# Patient Record
Sex: MALE | URBAN - METROPOLITAN AREA
[De-identification: names, ages, dates, MRNs, and addresses within clinical notes are randomized per-mention and may not be internally consistent; named-entity substitution may affect disease eponyms.]

---

## 2024-01-02 ENCOUNTER — OFFICE VISIT (OUTPATIENT)
Dept: URBAN - METROPOLITAN AREA CLINIC 27 | Facility: CLINIC | Age: 39
End: 2024-01-02
Payer: COMMERCIAL

## 2024-01-02 VITALS
BODY MASS INDEX: 28.77 KG/M2 | SYSTOLIC BLOOD PRESSURE: 134 MMHG | HEART RATE: 66 BPM | HEIGHT: 70 IN | DIASTOLIC BLOOD PRESSURE: 89 MMHG | WEIGHT: 201 LBS

## 2024-01-02 DIAGNOSIS — R19.8 IRREGULAR BOWEL HABITS: ICD-10-CM

## 2024-01-02 DIAGNOSIS — R63.4 WEIGHT LOSS: ICD-10-CM

## 2024-01-02 DIAGNOSIS — R14.0 BLOATING: ICD-10-CM

## 2024-01-02 DIAGNOSIS — R63.0 LOSS OF APPETITE: ICD-10-CM

## 2024-01-02 PROCEDURE — 99244 OFF/OP CNSLTJ NEW/EST MOD 40: CPT | Performed by: INTERNAL MEDICINE

## 2024-01-02 PROCEDURE — 99204 OFFICE O/P NEW MOD 45 MIN: CPT | Performed by: INTERNAL MEDICINE

## 2024-01-02 NOTE — HPI-TODAY'S VISIT:
Patient here at the request of Dr. Arriola for evaluation of bloating that has been present for the past month.  His symptoms began in early December without specific trigger.  The bloating was associated with persistent eructation.  He notes that the eructation is worse with alcohol and fried foods.  He has been using omeprazole and Gas-X which are somewhat helpful.  A recent 2-week trial of a probiotic was unhelpful.  He has lost 15 pounds over the past month, some of which he attributes to the above symptoms, and some to intermittent fasting (which did help his symptoms somewhat).  He has also had intermittent early satiety.  He has not had any reflux symptoms or dysphagia.  His stools have been irregular and tend towards mild constipation.  He does not take a fiber supplement and has not tried MiraLAX or prune juice.  He has had a decreased appetite.  He has not had a prior upper or lower endoscopy.  Recent labs and CT abdomen pelvis were unremarkable.  He does have mild/?moderate anxiety. There is no known family history of CRC or polyps.

## 2024-01-03 ENCOUNTER — LAB OUTSIDE AN ENCOUNTER (OUTPATIENT)
Dept: URBAN - METROPOLITAN AREA CLINIC 27 | Facility: CLINIC | Age: 39
End: 2024-01-03

## 2024-01-03 LAB — H PYLORI BREATH TEST: NOT DETECTED

## 2024-01-08 ENCOUNTER — WEB ENCOUNTER (OUTPATIENT)
Dept: URBAN - METROPOLITAN AREA CLINIC 27 | Facility: CLINIC | Age: 39
End: 2024-01-08

## 2024-01-12 ENCOUNTER — CLAIMS CREATED FROM THE CLAIM WINDOW (OUTPATIENT)
Dept: URBAN - METROPOLITAN AREA CLINIC 4 | Facility: CLINIC | Age: 39
End: 2024-01-12
Payer: COMMERCIAL

## 2024-01-12 ENCOUNTER — CLAIMS CREATED FROM THE CLAIM WINDOW (OUTPATIENT)
Dept: URBAN - METROPOLITAN AREA SURGERY CENTER 7 | Facility: SURGERY CENTER | Age: 39
End: 2024-01-12
Payer: COMMERCIAL

## 2024-01-12 ENCOUNTER — TELEPHONE ENCOUNTER (OUTPATIENT)
Dept: URBAN - METROPOLITAN AREA CLINIC 27 | Facility: CLINIC | Age: 39
End: 2024-01-12

## 2024-01-12 DIAGNOSIS — K31.9 ERYTHEMA OF GASTRIC ANTRUM: ICD-10-CM

## 2024-01-12 DIAGNOSIS — K22.9 IRREGULAR Z LINE OF ESOPHAGUS: ICD-10-CM

## 2024-01-12 DIAGNOSIS — K31.89 ACHYLIA: ICD-10-CM

## 2024-01-12 DIAGNOSIS — K31.89 OTHER DISEASES OF STOMACH AND DUODENUM: ICD-10-CM

## 2024-01-12 DIAGNOSIS — R14.0 ABDOMINAL BLOATING: ICD-10-CM

## 2024-01-12 DIAGNOSIS — R14.0 ABDOMINAL BLOATING -: ICD-10-CM

## 2024-01-12 DIAGNOSIS — Z12.11 COLON CANCER SCREENING: ICD-10-CM

## 2024-01-12 PROCEDURE — G8907 PT DOC NO EVENTS ON DISCHARG: HCPCS | Performed by: INTERNAL MEDICINE

## 2024-01-12 PROCEDURE — 43239 EGD BIOPSY SINGLE/MULTIPLE: CPT | Performed by: INTERNAL MEDICINE

## 2024-01-12 PROCEDURE — 88305 TISSUE EXAM BY PATHOLOGIST: CPT | Performed by: PATHOLOGY

## 2024-01-12 PROCEDURE — 00731 ANES UPR GI NDSC PX NOS: CPT | Performed by: NURSE ANESTHETIST, CERTIFIED REGISTERED

## 2024-01-16 ENCOUNTER — TELEPHONE ENCOUNTER (OUTPATIENT)
Dept: URBAN - METROPOLITAN AREA CLINIC 27 | Facility: CLINIC | Age: 39
End: 2024-01-16

## 2024-01-17 ENCOUNTER — OUT OF OFFICE VISIT (OUTPATIENT)
Dept: URBAN - METROPOLITAN AREA SURGERY CENTER 7 | Facility: SURGERY CENTER | Age: 39
End: 2024-01-17
Payer: COMMERCIAL

## 2024-01-17 ENCOUNTER — TELEPHONE ENCOUNTER (OUTPATIENT)
Dept: URBAN - METROPOLITAN AREA CLINIC 27 | Facility: CLINIC | Age: 39
End: 2024-01-17

## 2024-01-17 DIAGNOSIS — K64.8 HEMORRHOIDS, INTERNAL. NON-BLEEDING: ICD-10-CM

## 2024-01-17 DIAGNOSIS — Z12.11 COLON CANCER SCREENING: ICD-10-CM

## 2024-01-17 DIAGNOSIS — Z83.719 FH: COLON POLYPS: ICD-10-CM

## 2024-01-17 DIAGNOSIS — Z12.11 COLON CANCER SCREENING (HIGH RISK): ICD-10-CM

## 2024-01-17 DIAGNOSIS — Z83.718 FAMILY HISTORY OF FAMILIAL POLYPOSIS: ICD-10-CM

## 2024-01-17 PROCEDURE — G8907 PT DOC NO EVENTS ON DISCHARG: HCPCS | Performed by: INTERNAL MEDICINE

## 2024-01-17 PROCEDURE — G0105 COLORECTAL SCRN; HI RISK IND: HCPCS | Performed by: INTERNAL MEDICINE

## 2024-01-17 PROCEDURE — 00811 ANES LWR INTST NDSC NOS: CPT | Performed by: REGISTERED NURSE

## 2024-01-17 PROCEDURE — 00812 ANES LWR INTST SCR COLSC: CPT | Performed by: REGISTERED NURSE

## 2024-02-14 ENCOUNTER — OV EP (OUTPATIENT)
Dept: URBAN - METROPOLITAN AREA CLINIC 84 | Facility: CLINIC | Age: 39
End: 2024-02-14
Payer: COMMERCIAL

## 2024-02-14 VITALS
DIASTOLIC BLOOD PRESSURE: 79 MMHG | SYSTOLIC BLOOD PRESSURE: 120 MMHG | BODY MASS INDEX: 28.03 KG/M2 | HEART RATE: 58 BPM | WEIGHT: 195.8 LBS | HEIGHT: 70 IN | TEMPERATURE: 97.3 F

## 2024-02-14 DIAGNOSIS — R14.2 BELCHING SYMPTOM: ICD-10-CM

## 2024-02-14 DIAGNOSIS — R63.0 LOSS OF APPETITE: ICD-10-CM

## 2024-02-14 DIAGNOSIS — R14.0 BLOATING: ICD-10-CM

## 2024-02-14 DIAGNOSIS — R68.81 EARLY SATIETY: ICD-10-CM

## 2024-02-14 PROCEDURE — 99214 OFFICE O/P EST MOD 30 MIN: CPT | Performed by: INTERNAL MEDICINE

## 2024-02-14 NOTE — HPI-TODAY'S VISIT:
February 2024 visit: CT scan of the abdomen and pelvis with IV and oral contrast performed in December 2023 mentions no abnormal findings. Patient underwent a colonoscopy with Dr. Moreno in January 2024 and was unrevealing and asked to have a repeat colonoscopy in 5 years.   EGD performed in January 2024 also with Dr. Moreno mentions gastritis.  Gastric and small bowel biopsies were benign.  Since dec 2023 started to have frequent bloating and belching. labs with pcp and stool tests with pcp were normal. H Pylori BT negative in jan 2024.   he was advised to try low fodmap diet, also trying Fdgard and Ibgard , probiotics, omeprazole, metamucil, miralax. been consistently tasking omeprazole since 3 weeks. daily BM - formed stools. lost weight since changing diet. he is here to get a second opinion.

## 2024-04-18 ENCOUNTER — OV EP (OUTPATIENT)
Dept: URBAN - METROPOLITAN AREA CLINIC 27 | Facility: CLINIC | Age: 39
End: 2024-04-18

## 2024-04-18 ENCOUNTER — OV EP (OUTPATIENT)
Dept: URBAN - METROPOLITAN AREA CLINIC 84 | Facility: CLINIC | Age: 39
End: 2024-04-18
Payer: COMMERCIAL

## 2024-04-18 VITALS
TEMPERATURE: 98.6 F | WEIGHT: 187.6 LBS | HEART RATE: 59 BPM | DIASTOLIC BLOOD PRESSURE: 78 MMHG | SYSTOLIC BLOOD PRESSURE: 114 MMHG | BODY MASS INDEX: 26.86 KG/M2 | HEIGHT: 70 IN

## 2024-04-18 DIAGNOSIS — R14.2 BELCHING SYMPTOM: ICD-10-CM

## 2024-04-18 DIAGNOSIS — R14.0 BLOATING: ICD-10-CM

## 2024-04-18 DIAGNOSIS — R63.0 LOSS OF APPETITE: ICD-10-CM

## 2024-04-18 DIAGNOSIS — R68.81 EARLY SATIETY: ICD-10-CM

## 2024-04-18 PROCEDURE — 99213 OFFICE O/P EST LOW 20 MIN: CPT | Performed by: INTERNAL MEDICINE

## 2024-04-18 NOTE — HPI-TODAY'S VISIT:
April 2024 visit:SIBO breath test from February 2024 did not reveal any evidence of bacterial overgrowth. taking daily probiotic. also doing metamucil. take Ibgard and gas x prn. eating more foods / reintroduced fodmap foods. forgot to see nutritionist. off PPI. no nausea / emesis. he feels he can eat limited amount of food , gets full at times after eating small amount of food.

## 2024-07-22 ENCOUNTER — WEB ENCOUNTER (OUTPATIENT)
Dept: URBAN - METROPOLITAN AREA CLINIC 84 | Facility: CLINIC | Age: 39
End: 2024-07-22

## 2024-07-29 ENCOUNTER — OFFICE VISIT (OUTPATIENT)
Dept: URBAN - METROPOLITAN AREA CLINIC 84 | Facility: CLINIC | Age: 39
End: 2024-07-29

## 2024-07-29 ENCOUNTER — DASHBOARD ENCOUNTERS (OUTPATIENT)
Age: 39
End: 2024-07-29

## 2024-08-06 ENCOUNTER — OFFICE VISIT (OUTPATIENT)
Dept: URBAN - METROPOLITAN AREA CLINIC 84 | Facility: CLINIC | Age: 39
End: 2024-08-06
Payer: COMMERCIAL

## 2024-08-06 VITALS
DIASTOLIC BLOOD PRESSURE: 73 MMHG | BODY MASS INDEX: 27.17 KG/M2 | SYSTOLIC BLOOD PRESSURE: 115 MMHG | TEMPERATURE: 97.2 F | HEIGHT: 70 IN | HEART RATE: 58 BPM | WEIGHT: 189.8 LBS

## 2024-08-06 DIAGNOSIS — R14.0 BLOATING: ICD-10-CM

## 2024-08-06 DIAGNOSIS — R14.2 ERUCTATION: ICD-10-CM

## 2024-08-06 PROCEDURE — 99213 OFFICE O/P EST LOW 20 MIN: CPT

## 2024-08-06 NOTE — HPI-TODAY'S VISIT:
08/24 OV  Belching symptoms are usually postprandial, with mild dietary modifications. Gas x w/ mild efficacy denies any associated abdominal pain. Bowels are daily once a day, w/ complete evacuation, no hard stools/straining, and daily use of Metamucil. No N/V, but notes early satiety is still present occasionally.

## 2024-08-06 NOTE — HPI-OTHER HISTORIES
April 2024 visit:SIBO breath test from February 2024 did not reveal any evidence of bacterial overgrowth. taking daily probiotic. also doing metamucil. take Ibgard and gas x prn. eating more foods / reintroduced fodmap foods. forgot to see nutritionist. off PPI. no nausea / emesis. he feels he can eat limited amount of food , gets full at times after eating small amount of food.  February 2024 visit: CT scan of the abdomen and pelvis with IV and oral contrast performed in December 2023 mentions no abnormal findings. Patient underwent a colonoscopy with Dr. Moreno in January 2024 and was unrevealing and asked to have a repeat colonoscopy in 5 years.   EGD performed in January 2024 also with Dr. Moreno mentions gastritis. Gastric and small bowel biopsies were benign.  Since dec 2023 started to have frequent bloating and belching. labs with pcp and stool tests with pcp were normal. H Pylori BT negative in jan 2024.   he was advised to try low fodmap diet, also trying Fdgard and Ibgard , probiotics, omeprazole, metamucil, miralax. been consistently tasking omeprazole since 3 weeks. daily BM - formed stools. lost weight since changing diet. he is here to get a second opinion.

## 2024-08-07 ENCOUNTER — OFFICE VISIT (OUTPATIENT)
Dept: URBAN - METROPOLITAN AREA TELEHEALTH 2 | Facility: TELEHEALTH | Age: 39
End: 2024-08-07
Payer: COMMERCIAL

## 2024-08-07 DIAGNOSIS — K58.8 OTHER IRRITABLE BOWEL SYNDROME: ICD-10-CM

## 2024-08-07 PROCEDURE — 97802 MEDICAL NUTRITION INDIV IN: CPT | Performed by: DIETITIAN, REGISTERED

## 2024-09-23 ENCOUNTER — WEB ENCOUNTER (OUTPATIENT)
Dept: URBAN - METROPOLITAN AREA CLINIC 84 | Facility: CLINIC | Age: 39
End: 2024-09-23

## 2024-11-04 ENCOUNTER — OFFICE VISIT (OUTPATIENT)
Dept: URBAN - METROPOLITAN AREA CLINIC 84 | Facility: CLINIC | Age: 39
End: 2024-11-04